# Patient Record
Sex: MALE | Race: WHITE | NOT HISPANIC OR LATINO | Employment: OTHER | URBAN - METROPOLITAN AREA
[De-identification: names, ages, dates, MRNs, and addresses within clinical notes are randomized per-mention and may not be internally consistent; named-entity substitution may affect disease eponyms.]

---

## 2023-12-21 ENCOUNTER — APPOINTMENT (OUTPATIENT)
Dept: GENERAL RADIOLOGY | Facility: HOSPITAL | Age: 33
End: 2023-12-21
Payer: MEDICAID

## 2023-12-21 ENCOUNTER — HOSPITAL ENCOUNTER (EMERGENCY)
Facility: HOSPITAL | Age: 33
Discharge: HOME | End: 2023-12-21
Attending: EMERGENCY MEDICINE
Payer: MEDICAID

## 2023-12-21 VITALS
DIASTOLIC BLOOD PRESSURE: 81 MMHG | RESPIRATION RATE: 18 BRPM | SYSTOLIC BLOOD PRESSURE: 138 MMHG | OXYGEN SATURATION: 98 % | TEMPERATURE: 97 F | HEART RATE: 79 BPM

## 2023-12-21 DIAGNOSIS — M25.571 ACUTE RIGHT ANKLE PAIN: Primary | ICD-10-CM

## 2023-12-21 PROCEDURE — 99282 EMERGENCY DEPT VISIT SF MDM: CPT

## 2023-12-21 PROCEDURE — 73610 X-RAY EXAM OF ANKLE: CPT

## 2024-12-18 ENCOUNTER — APPOINTMENT (OUTPATIENT)
Dept: GENERAL RADIOLOGY | Facility: HOSPITAL | Age: 34
End: 2024-12-18
Payer: MEDICARE

## 2024-12-18 ENCOUNTER — HOSPITAL ENCOUNTER (EMERGENCY)
Facility: HOSPITAL | Age: 34
Discharge: HOME | End: 2024-12-18
Attending: EMERGENCY MEDICINE | Admitting: EMERGENCY MEDICINE
Payer: MEDICARE

## 2024-12-18 VITALS
OXYGEN SATURATION: 99 % | SYSTOLIC BLOOD PRESSURE: 110 MMHG | TEMPERATURE: 97 F | HEART RATE: 81 BPM | HEIGHT: 63 IN | DIASTOLIC BLOOD PRESSURE: 56 MMHG | RESPIRATION RATE: 20 BRPM

## 2024-12-18 DIAGNOSIS — F84.0 AUTISM: ICD-10-CM

## 2024-12-18 DIAGNOSIS — M54.50 ACUTE BILATERAL LOW BACK PAIN WITHOUT SCIATICA: Primary | ICD-10-CM

## 2024-12-18 LAB
BILIRUB UR QL STRIP: NEGATIVE
CLARITY UR: CLEAR
COLOR UR: YELLOW
GLUCOSE UR STRIP-MCNC: NEGATIVE MG/DL
HGB UR QL STRIP.AUTO: NEGATIVE
KETONES UR QL STRIP: NEGATIVE
LEUKOCYTE ESTERASE UR QL STRIP.AUTO: NEGATIVE
NITRITE UR QL STRIP: NEGATIVE
PH UR STRIP.AUTO: 8 [PH] (ref 5–8)
PROT UR QL STRIP: NEGATIVE
SP GR UR STRIP: 1.01 (ref 1–1.03)
UROBILINOGEN UR QL STRIP: NORMAL

## 2024-12-18 PROCEDURE — 71045 X-RAY EXAM CHEST 1 VIEW: CPT

## 2024-12-18 PROCEDURE — 99283 EMERGENCY DEPT VISIT LOW MDM: CPT

## 2024-12-18 PROCEDURE — 72100 X-RAY EXAM L-S SPINE 2/3 VWS: CPT

## 2024-12-18 PROCEDURE — 81003 URINALYSIS AUTO W/O SCOPE: CPT | Performed by: EMERGENCY MEDICINE

## 2024-12-18 RX ORDER — LORAZEPAM 1 MG/1
2 TABLET ORAL ONCE
Status: COMPLETED | OUTPATIENT
Start: 2024-12-18 | End: 2024-12-18

## 2024-12-18 RX ADMIN — LORAZEPAM 2 MG: 1 TABLET ORAL at 12:04

## 2024-12-18 NOTE — ED TRIAGE NOTES
Patient to ED via pv from home. Patient accompanied to ED by family home provider with [facility]. Patient's home provider thinks he may have had a seizure this morning. She reports that she heard him fall while he was in the bathroom. Patient has no history of seizure. Patient was not incontinent of urine and no tongue trauma seen.

## 2024-12-18 NOTE — CASE MANAGEMENT/SOCIAL WORK
"Discharge Planning Assessment  [facility]     Patient Name: Gilberto Barcenas  MRN: [id number]  Today's Date: 12/18/2024    Admit Date: 12/18/2024        Discharge Needs Assessment    No documentation.                  Discharge Plan       Row Name 12/18/24 1128       Plan    Plan Comments Notified patient has legal guardian, however, no paperwork on file; registration spoke w/sister (listed as guardian on case management paperwork)- obtained consent for treatment- no banner on file until guardianship paperwork obtained; Sister is \"on the way here\" and was notified by registration to bring paperwork.                  Continued Care and Services - Admitted Since 12/18/2024    No active coordination exists for this encounter.          Demographic Summary    No documentation.                  Functional Status    No documentation.                  Psychosocial    No documentation.                  Abuse/Neglect    No documentation.                  Legal    No documentation.                  Substance Abuse    No documentation.                  Patient Forms    No documentation.                     Mariposa Ervin RN    "

## 2024-12-18 NOTE — ED PROVIDER NOTES
EMERGENCY DEPARTMENT MD ATTESTATION NOTE    Room Number:  17/17  PCP: Howard Zhao MD  Independent Historians: Patient and Caregiver    HPI:  A complete HPI/ROS/PMH/PSH/SH/FH are unobtainable due to: Poor historian    Chronic or social conditions impacting patient care (Social Determinants of Health): None      Context: Gilberto Barcenas is a 33 y.o. male with a medical history of autism, combativeness, GERD who presents to the ED c/o acute fall.  Caregiver reports that this morning the patient fell in the bathroom.  She states she was just outside the door and she heard a fall.  She went inside and the patient was sitting on the ground crisscross [facility].  He seems somewhat confused.  He has never had similar episodes in the past.  They went to [facility] and he was observed.  No diagnostics were performed.  They talked with the guardian who wanted him brought here for further evaluation.  The caregiver reports that he has been at his baseline.  She reports that he gets combative with medical interventions.        Review of prior external notes (non-ED) -and- Review of prior external test results outside of this encounter:  CBC 11/28/2023 was normal    Prescription drug monitoring program review: ALYSA reviewed by David Perry MD         PHYSICAL EXAM    I have reviewed the triage vital signs and nursing notes.    ED Triage Vitals   Temp Heart Rate Resp BP SpO2   12/18/24 1005 12/18/24 1005 12/18/24 1005 12/18/24 1018 12/18/24 1005   97 °F (36.1 °C) 91 18 129/81 99 %      Temp src Heart Rate Source Patient Position BP Location FiO2 (%)   -- -- -- -- --              Physical Exam  GENERAL: Awake, alert, no acute distress, difficult exam.  No external signs of trauma  SKIN: Warm, dry  HENT: Normocephalic, atraumatic  EYES: no scleral icterus  CV: regular rhythm, regular rate  RESPIRATORY: normal effort, lungs clear  ABDOMEN: soft, nontender, nondistended  MUSCULOSKELETAL: no deformity, tenderness  in the lumbar region  NEURO: alert, moves all extremities, follows commands            MEDICATIONS GIVEN IN ER  Medications   LORazepam (ATIVAN) tablet 2 mg (2 mg Oral Given 12/18/24 1204)         ORDERS PLACED DURING THIS VISIT:  Orders Placed This Encounter   Procedures    XR Chest 1 View    XR Spine Lumbar 2 or 3 View    Urinalysis With Microscopic If Indicated (No Culture) - Urine, Clean Catch    Vital Signs Recheck         PROCEDURES  Procedures      Critical care provider statement:    Critical care time (minutes): 55.   Critical care time was exclusive of:  Separately billable procedures and treating other patients   Critical care was necessary to treat or prevent imminent or life-threatening deterioration of the following conditions:  CNS Failure   Critical care was time spent personally by me on the following activities:  Development of treatment plan with patient or surrogate, discussions with consultants, evaluation of patient's response to treatment, examination of patient, obtaining history from patient or surrogate, ordering and performing treatments and interventions, ordering and review of laboratory studies, ordering and review of radiographic studies, pulse oximetry, re-evaluation of patient's condition and review of old charts. Critical Care indicators:        PROGRESS, DATA ANALYSIS, CONSULTS, AND MEDICAL DECISION MAKING  All labs have been independently interpreted by me.  All radiology studies have been reviewed by me. All EKG's have been independently viewed and interpreted by me.  Discussion below represents my analysis of pertinent findings related to patient's condition, differential diagnosis, treatment plan and final disposition.    Differential diagnosis includes but is not limited to seizure, syncope, fall, closed head injury,.    Clinical Scores:                                     ED Course as of 12/18/24 1845   Wed Dec 18, 2024   1039 MARYLOU Garay spoke with patient's sister who is his  guardian about obtaining labs and imaging. Patient has a history of being aggressive with blood draws and has been uncooperative for EKG. Per sister, she would like to proceed with blood draw and EKG even if that means restraining patient.  [JG]   1319 Per my independent interpretation of the chest x-ray, there is no obvious pneumothorax.   [JG]   1343 Nursing was unable to obtain laboratory evaluation.  We they were able to obtain imaging.  In order to obtain laboratory evaluation it would require significant physical presence and physical restraint.  The risk outweighs the benefits in this scenario. [TR]   1353 I discussed with the daughter who is the POA by phone.  The risk outweighs the benefits of obtaining labs and EKG at this point.  She is agreeable with plan to discharge home with the data that we have. [TR]   1354 I see no indication for head CT at this time given the patient's mental status is at his baseline and there is no evidence of head trauma.  Given my evaluation here and the difficulty we have had, I do not see any way that CT imaging would be obtained anyway. [TR]   1408 XR Chest 1 View  My independent interpretation of the imaging study is no pneumothorax [TR]      ED Course User Index  [JG] Jane Nicole APRN  [TR] David Perry MD       MDM: The patient will require some sedation in order to perform an evaluation.  He does not allow blood draw..  Imaging will be difficult without sedation.  This does not sound like a syncopal episode based on that he was sitting crisscross applesauce as soon as the caregiver got in the room.      COMPLEXITY OF CARE  Admission was considered but after careful review of the patient's presentation, physical examination, diagnostic results, and response to treatment the patient may be safely discharged with outpatient follow-up.    Please note that portions of this document were completed with a voice recognition program.    Note Disclaimer: At [facility], we believe that sharing information builds trust and better relationships. You are receiving this note because you recently visited [facility]. It is possible you will see health information before a provider has talked with you about it. This kind of information can be easy to misunderstand. To help you fully understand what it means for your health, we urge you to discuss this note with your provider.         David Perry MD  12/18/24 0633       David Perry MD  12/18/24 2102

## 2024-12-18 NOTE — DISCHARGE INSTRUCTIONS
Use Tylenol for pain.  Return immediately for any further episodes of falling or confusion.  Return immediately for any further injury.

## 2024-12-18 NOTE — ED PROVIDER NOTES
" EMERGENCY DEPARTMENT ENCOUNTER    Room Number:  17/17  Date seen:  12/18/2024  PCP: Howard Zhao MD  Historian/Independent historian: caregiver  Chronic or social conditions impacting care: autism       HPI:  Chief Complaint: back pain  A complete HPI/ROS/PMH/PSH/SH/FH are unobtainable due to: Autism  Context: Gilberto Barcenas is a 33 y.o. male who presents to the ED c/o back pain.  Patient was brought in by his caregiver who he lives with with concerns for a possible syncopal episode and/or seizure like activity this morning.  She states that she was standing outside the bathroom waiting on him to urinate whenever she heard something and walked into the bathroom and he was sitting \"Eritre[place] style on the ground with a look on his face like he was not there\".  She states that she took him to [facility] this morning and they did not do labs or any imaging, but the agency that she works with requested that she bring him back in for labs and further workup.  There was no tonic clonic movement, tongue biting, loss of bowel or bladder control.  He has no history of seizures.  He is complaining of some mild mid back pain.  He is ambulating without any difficulty.  History is limited due to patient's autism.    Sister is legal guardian.    External Medical record review:   12/18/2024: ER visit to [facility].  No labs or imaging were obtained.      PAST MEDICAL HISTORY  Active Ambulatory Problems     Diagnosis Date Noted    No Active Ambulatory Problems     Resolved Ambulatory Problems     Diagnosis Date Noted    No Resolved Ambulatory Problems     Past Medical History:   Diagnosis Date    Allergic rhinitis     Autistic disorder     Eczema     Generalized anxiety disorder     GERD (gastroesophageal reflux disease)     Impacted cerumen     Other symptoms and signs concerning food and fluid intake     Pruritus     Sleep disorder     Tinea barbae     Tinea corporis     Unspecified intellectual " disabilities          PAST SURGICAL HISTORY  History reviewed. No pertinent surgical history.      FAMILY HISTORY  History reviewed. No pertinent family history.      SOCIAL HISTORY  Social History     Socioeconomic History    Marital status: Single   Tobacco Use    Smoking status: Unknown   Substance and Sexual Activity    Alcohol use: Not Currently    Drug use: Defer    Sexual activity: Defer         ALLERGIES  Patient has no known allergies.        REVIEW OF SYSTEMS  Per HPI, otherwise negative.       PHYSICAL EXAM  ED Triage Vitals [12/18/24 1005]   Temp Heart Rate Resp BP SpO2   97 °F (36.1 °C) 91 18 -- 99 %      Temp src Heart Rate Source Patient Position BP Location FiO2 (%)   -- -- -- -- --       Physical Exam  Vitals and nursing note reviewed.   Constitutional:       Appearance: Normal appearance. He is well-developed.   HENT:      Head: Normocephalic and atraumatic.      Mouth/Throat:      Mouth: Mucous membranes are moist.   Eyes:      Conjunctiva/sclera: Conjunctivae normal.   Cardiovascular:      Rate and Rhythm: Normal rate and regular rhythm.      Pulses: Normal pulses.      Heart sounds: Normal heart sounds.   Pulmonary:      Effort: Pulmonary effort is normal.      Breath sounds: Normal breath sounds. No wheezing or rhonchi.   Abdominal:      General: Bowel sounds are normal.      Palpations: Abdomen is soft.      Tenderness: There is no abdominal tenderness. There is no guarding.   Musculoskeletal:      Cervical back: Normal range of motion. No tenderness.      Comments: No midline cervical spine tenderness. No obvious step-offs or deformities. Strength 5/5 equal to BUE. Sensation intact to light touch. FROM.    No midline thoracic spine tenderness. No obvious step-off or deformities.     Mild, middle lumbar spine tenderness. Negative straight-leg exam bilaterally. Strength 5/5 and equal to BLE. Sensation intact to light touch. FROM.     Skin:     General: Skin is warm.      Capillary Refill:  Capillary refill takes less than 2 seconds.   Neurological:      Mental Status: He is alert and oriented to person, place, and time. Mental status is at baseline.   Psychiatric:         Mood and Affect: Mood normal.               LAB RESULTS  Recent Results (from the past 24 hours)   Urinalysis With Microscopic If Indicated (No Culture) - Urine, Clean Catch    Collection Time: 12/18/24  1:01 PM    Specimen: Urine, Clean Catch   Result Value Ref Range    Color, UA Yellow Yellow, Straw    Appearance, UA Clear Clear    pH, UA 8.0 5.0 - 8.0    Specific Gravity, UA 1.007 1.005 - 1.030    Glucose, UA Negative Negative    Ketones, UA Negative Negative    Bilirubin, UA Negative Negative    Blood, UA Negative Negative    Protein, UA Negative Negative    Leuk Esterase, UA Negative Negative    Nitrite, UA Negative Negative    Urobilinogen, UA 0.2 E.U./dL 0.2 - 1.0 E.U./dL       Ordered the above labs and reviewed the results.        RADIOLOGY  XR Chest 1 View, XR Spine Lumbar 2 or 3 View    Result Date: 12/18/2024  XR CHEST 1 VW-, XR SPINE LUMBAR 2 OR 3 VW-  HISTORY: Male who is 33 years-old, syncope, pain  TECHNIQUE: Frontal view of the chest, 3 views of the lumbar spine  COMPARISON: None available  FINDINGS:  Chest x-ray: Heart, mediastinum and pulmonary vasculature are unremarkable. No focal pulmonary consolidation, pleural effusion, or pneumothorax is seen, limited by supine positioning. No acute osseous process.  Lumbar spine x-ray: Alignment is in range of normal. Vertebral body and disc space heights appear preserved. No acute fracture is identified. If further imaging evaluation of the spine is indicated, MRI could be considered.      As described.  This report was finalized on 12/18/2024 1:12 PM by Dr. Angel Higginbotham M.D on Workstation: [id number])       Ordered the above noted radiological studies. Reviewed by me in PACS.        MEDICATIONS GIVEN IN ER  Medications   LORazepam (ATIVAN) tablet 2 mg (2 mg Oral Given  12/18/24 1204)           MEDICAL DECISION MAKING, PROGRESS, and CONSULTS    All labs have been independently reviewed by me.  All radiology studies have been reviewed by me and I have also reviewed the radiology report.   EKG's independently viewed and interpreted by me.  Discussion below represents my analysis of pertinent findings related to patient's condition, differential diagnosis, treatment plan and final disposition.    33-year-old male with autism brought in by caregiver with concern for fall/seizure/syncope.  Attempted to get blood work, but were unable to due to patient's autism as he became combative and uncooperative.  Patient is well-appearing and appears to be at his baseline.  Strict return precautions discussed with caregiver and sister.          Shared decision making/consideration for admission:  stable for outpatient follow up      Orders placed during this visit:  Orders Placed This Encounter   Procedures    XR Chest 1 View    XR Spine Lumbar 2 or 3 View    Urinalysis With Microscopic If Indicated (No Culture) - Urine, Clean Catch    Vital Signs Recheck         Differential diagnosis include, but not limited to: compression fracture, electrolyte abnormality, vasovagal syncope       Additional orders considered but not ordered: cbc, cmp- unable to obtain    Independent interpretation of labs, radiology studies, and discussions with consultants:    Discussed with Dr. Perry, who agrees with plan.     ED Course as of 12/18/24 1513   Wed Dec 18, 2024   1039 Tanisha RN spoke with patient's sister who is his guardian about obtaining labs and imaging. Patient has a history of being aggressive with blood draws and has been uncooperative for EKG. Per sister, she would like to proceed with blood draw and EKG even if that means restraining patient.  [JG]   1319 Per my independent interpretation of the chest x-ray, there is no obvious pneumothorax.   [JG]   1343 Nursing was unable to obtain laboratory  evaluation.  We they were able to obtain imaging.  In order to obtain laboratory evaluation it would require significant physical presence and physical restraint.  The risk outweighs the benefits in this scenario. [TR]   1353 I discussed with the daughter who is the POA by phone.  The risk outweighs the benefits of obtaining labs and EKG at this point.  She is agreeable with plan to discharge home with the data that we have. [TR]   1354 I see no indication for head CT at this time given the patient's mental status is at his baseline and there is no evidence of head trauma.  Given my evaluation here and the difficulty we have had, I do not see any way that CT imaging would be obtained anyway. [TR]   1408 XR Chest 1 View  My independent interpretation of the imaging study is no pneumothorax [TR]      ED Course User Index  [JG] Jane Nicole APRN  [TR] David Perry MD             DIAGNOSIS  Final diagnoses:   Acute bilateral low back pain without sciatica   Autism         DISPOSITION  discharge        Latest Documented Vital Signs:  As of 15:13 EST  BP- 110/56 HR- 81 Temp- 97 °F (36.1 °C) O2 sat- 99%      ALYSA reviewed by David Perry MD       --    Please note that portions of this were completed with a voice recognition program.       Note Disclaimer: At [facility], we believe that sharing information builds trust and better relationships. You are receiving this note because you are receiving care at [facility] or recently visited. It is possible you will see health information before a provider has talked with you about it. This kind of information can be easy to misunderstand. To help you fully understand what it means for your health, we urge you to discuss this note with your provider.             Jane Nicole APRN  12/18/24 1515       Jane Nicole APRN  12/18/24 1514